# Patient Record
Sex: MALE | Race: BLACK OR AFRICAN AMERICAN | NOT HISPANIC OR LATINO | RURAL
[De-identification: names, ages, dates, MRNs, and addresses within clinical notes are randomized per-mention and may not be internally consistent; named-entity substitution may affect disease eponyms.]

---

## 2022-05-31 ASSESSMENT — VISUAL ACUITY
OD_SC: 20/20-2
OU_SC: 20/20
OS_SC: 20/20

## 2022-06-01 ENCOUNTER — EMERGENCY VISIT (OUTPATIENT)
Dept: RURAL CLINIC 1 | Facility: CLINIC | Age: 52
End: 2022-06-01

## 2022-06-01 DIAGNOSIS — T15.02XA: ICD-10-CM

## 2022-06-01 PROCEDURE — 99214 OFFICE O/P EST MOD 30 MIN: CPT

## 2022-06-01 PROCEDURE — 65222 REMOVE FOREIGN BODY FROM EYE: CPT

## 2022-06-01 NOTE — PROCEDURE NOTE: CLINICAL
PROCEDURE NOTE: Removal of Corneal FB at Slit Lamp OS. Diagnosis: Corneal Foreign Body. Anesthesia: Topical. The risks, benefits, and alternatives of foreign body removal were discussed. Removed foreign body spud.